# Patient Record
Sex: FEMALE | Race: WHITE | NOT HISPANIC OR LATINO | URBAN - METROPOLITAN AREA
[De-identification: names, ages, dates, MRNs, and addresses within clinical notes are randomized per-mention and may not be internally consistent; named-entity substitution may affect disease eponyms.]

---

## 2019-02-04 ENCOUNTER — APPOINTMENT (RX ONLY)
Dept: URBAN - METROPOLITAN AREA CLINIC 176 | Facility: CLINIC | Age: 54
Setting detail: DERMATOLOGY
End: 2019-02-04

## 2019-02-04 VITALS — WEIGHT: 220 LBS | HEIGHT: 65 IN

## 2019-02-04 DIAGNOSIS — L57.8 OTHER SKIN CHANGES DUE TO CHRONIC EXPOSURE TO NONIONIZING RADIATION: ICD-10-CM

## 2019-02-04 DIAGNOSIS — L82.0 INFLAMED SEBORRHEIC KERATOSIS: ICD-10-CM

## 2019-02-04 DIAGNOSIS — L73.8 OTHER SPECIFIED FOLLICULAR DISORDERS: ICD-10-CM

## 2019-02-04 DIAGNOSIS — L91.8 OTHER HYPERTROPHIC DISORDERS OF THE SKIN: ICD-10-CM

## 2019-02-04 DIAGNOSIS — L72.0 EPIDERMAL CYST: ICD-10-CM

## 2019-02-04 PROBLEM — F41.9 ANXIETY DISORDER, UNSPECIFIED: Status: ACTIVE | Noted: 2019-02-04

## 2019-02-04 PROBLEM — J30.1 ALLERGIC RHINITIS DUE TO POLLEN: Status: ACTIVE | Noted: 2019-02-04

## 2019-02-04 PROBLEM — I10 ESSENTIAL (PRIMARY) HYPERTENSION: Status: ACTIVE | Noted: 2019-02-04

## 2019-02-04 PROBLEM — J45.909 UNSPECIFIED ASTHMA, UNCOMPLICATED: Status: ACTIVE | Noted: 2019-02-04

## 2019-02-04 PROBLEM — F32.9 MAJOR DEPRESSIVE DISORDER, SINGLE EPISODE, UNSPECIFIED: Status: ACTIVE | Noted: 2019-02-04

## 2019-02-04 PROCEDURE — ? COUNSELING

## 2019-02-04 PROCEDURE — 99213 OFFICE O/P EST LOW 20 MIN: CPT | Mod: 25

## 2019-02-04 PROCEDURE — ? BENIGN DESTRUCTION

## 2019-02-04 PROCEDURE — ? PRESCRIPTION SAMPLES PROVIDED

## 2019-02-04 PROCEDURE — 17110 DESTRUCTION B9 LES UP TO 14: CPT

## 2019-02-04 ASSESSMENT — LOCATION SIMPLE DESCRIPTION DERM
LOCATION SIMPLE: LEFT ANTERIOR NECK
LOCATION SIMPLE: LEFT SHOULDER
LOCATION SIMPLE: LEFT FOREARM
LOCATION SIMPLE: RIGHT FOREARM
LOCATION SIMPLE: LEFT FOREHEAD
LOCATION SIMPLE: RIGHT FOREHEAD
LOCATION SIMPLE: LEFT LIP
LOCATION SIMPLE: RIGHT ANTERIOR NECK

## 2019-02-04 ASSESSMENT — LOCATION DETAILED DESCRIPTION DERM
LOCATION DETAILED: LEFT ANTERIOR SHOULDER
LOCATION DETAILED: RIGHT DISTAL DORSAL FOREARM
LOCATION DETAILED: LEFT INFERIOR ANTERIOR NECK
LOCATION DETAILED: LEFT INFERIOR VERMILION LIP
LOCATION DETAILED: LEFT DISTAL DORSAL FOREARM
LOCATION DETAILED: LEFT INFERIOR FOREHEAD
LOCATION DETAILED: RIGHT INFERIOR ANTERIOR NECK
LOCATION DETAILED: LEFT FOREHEAD
LOCATION DETAILED: RIGHT MEDIAL FOREHEAD

## 2019-02-04 ASSESSMENT — LOCATION ZONE DERM
LOCATION ZONE: FACE
LOCATION ZONE: ARM
LOCATION ZONE: NECK
LOCATION ZONE: LIP

## 2019-02-04 NOTE — PROCEDURE: BENIGN DESTRUCTION
Treatment Number (Will Not Render If 0): 0
Medical Necessity Information: It is in your best interest to select a reason for this procedure from the list below. All of these items fulfill various CMS LCD requirements except the new and changing color options.
Add 52 Modifier (Optional): no
Consent: The patient's consent was obtained including but not limited to risks of crusting, scabbing, blistering, scarring, darker or lighter pigmentary change, recurrence, incomplete removal and infection.
Anesthesia Volume In Cc: 0.5
Medical Necessity Clause: This procedure was medically necessary because the lesions that were treated were:
Render Post-Care Instructions In Note?: yes
Detail Level: Detailed
Post-Care Instructions: I reviewed with the patient in detail post-care instructions. Patient is to wear sunprotection, and avoid picking at any of the treated lesions. Pt may apply Vaseline to crusted or scabbing areas.

## 2019-02-04 NOTE — HPI: SKIN LESION
Has Your Skin Lesion Been Treated?: not been treated
Is This A New Presentation, Or A Follow-Up?: Skin Lesions
Additional History: Patient is here for a spot check.

## 2021-01-26 ENCOUNTER — APPOINTMENT (RX ONLY)
Dept: URBAN - METROPOLITAN AREA CLINIC 176 | Facility: CLINIC | Age: 56
Setting detail: DERMATOLOGY
End: 2021-01-26

## 2021-01-26 VITALS — HEIGHT: 55 IN | WEIGHT: 224 LBS

## 2021-01-26 DIAGNOSIS — L73.8 OTHER SPECIFIED FOLLICULAR DISORDERS: ICD-10-CM

## 2021-01-26 DIAGNOSIS — L57.8 OTHER SKIN CHANGES DUE TO CHRONIC EXPOSURE TO NONIONIZING RADIATION: ICD-10-CM

## 2021-01-26 DIAGNOSIS — L72.0 EPIDERMAL CYST: ICD-10-CM

## 2021-01-26 DIAGNOSIS — D18.0 HEMANGIOMA: ICD-10-CM

## 2021-01-26 DIAGNOSIS — L91.8 OTHER HYPERTROPHIC DISORDERS OF THE SKIN: ICD-10-CM

## 2021-01-26 PROBLEM — D18.01 HEMANGIOMA OF SKIN AND SUBCUTANEOUS TISSUE: Status: ACTIVE | Noted: 2021-01-26

## 2021-01-26 PROCEDURE — 99213 OFFICE O/P EST LOW 20 MIN: CPT | Mod: 25

## 2021-01-26 PROCEDURE — ? SKIN TAG REMOVAL

## 2021-01-26 PROCEDURE — ? PRESCRIPTION SAMPLES PROVIDED

## 2021-01-26 PROCEDURE — ? COUNSELING

## 2021-01-26 PROCEDURE — 11200 RMVL SKIN TAGS UP TO&INC 15: CPT

## 2021-01-26 ASSESSMENT — LOCATION SIMPLE DESCRIPTION DERM
LOCATION SIMPLE: RIGHT UPPER BACK
LOCATION SIMPLE: LEFT LIP
LOCATION SIMPLE: LEFT FOREARM
LOCATION SIMPLE: LEFT FOREHEAD
LOCATION SIMPLE: RIGHT FOREHEAD
LOCATION SIMPLE: RIGHT FOREARM
LOCATION SIMPLE: LEFT ANTERIOR NECK
LOCATION SIMPLE: LEFT UPPER BACK

## 2021-01-26 ASSESSMENT — LOCATION ZONE DERM
LOCATION ZONE: TRUNK
LOCATION ZONE: ARM
LOCATION ZONE: FACE
LOCATION ZONE: LIP
LOCATION ZONE: NECK

## 2021-01-26 ASSESSMENT — LOCATION DETAILED DESCRIPTION DERM
LOCATION DETAILED: RIGHT MEDIAL FOREHEAD
LOCATION DETAILED: RIGHT MID-UPPER BACK
LOCATION DETAILED: LEFT FOREHEAD
LOCATION DETAILED: LEFT INFERIOR VERMILION LIP
LOCATION DETAILED: LEFT INFERIOR LATERAL NECK
LOCATION DETAILED: LEFT INFERIOR UPPER BACK
LOCATION DETAILED: LEFT DISTAL DORSAL FOREARM
LOCATION DETAILED: LEFT INFERIOR FOREHEAD
LOCATION DETAILED: RIGHT DISTAL DORSAL FOREARM

## 2021-01-26 NOTE — PROCEDURE: SKIN TAG REMOVAL
Medical Necessity Clause: This procedure was medically necessary because the lesions that were treated were:
Detail Level: Detailed
Anesthesia Volume In Cc: 3
Medical Necessity Information: It is in your best interest to select a reason for this procedure from the list below. All of these items fulfill various CMS LCD requirements except the new and changing color options.
Add Associated Diagnoses If Applicable When Selecting Medical Necessity: Yes
Consent: Written consent obtained and the risks of skin tag removal was reviewed with the patient including but not limited to bleeding, pigmentary change, infection, pain, and remote possibility of scarring.
Anesthesia Type: 1% lidocaine with epinephrine
Include Z78.9 (Other Specified Conditions Influencing Health Status) As An Associated Diagnosis?: No

## 2025-03-30 NOTE — PROCEDURE: MIPS QUALITY
Patient discharged from the ED by Angie GUERRERO. Diagnosis, medications, precautions and follow-ups were reviewed with the patient/family. Questions were asked and answered prior to departure.   
Detail Level: Detailed
Quality 130: Documentation Of Current Medications In The Medical Record: Current Medications Documented